# Patient Record
Sex: FEMALE | Employment: FULL TIME | URBAN - METROPOLITAN AREA
[De-identification: names, ages, dates, MRNs, and addresses within clinical notes are randomized per-mention and may not be internally consistent; named-entity substitution may affect disease eponyms.]

---

## 2017-11-13 ENCOUNTER — TELEPHONE (OUTPATIENT)
Dept: INTERNAL MEDICINE | Facility: CLINIC | Age: 42
End: 2017-11-13

## 2017-11-13 ENCOUNTER — OFFICE VISIT (OUTPATIENT)
Dept: INTERNAL MEDICINE | Facility: CLINIC | Age: 42
End: 2017-11-13
Attending: FAMILY MEDICINE
Payer: MEDICAID

## 2017-11-13 VITALS
HEART RATE: 69 BPM | TEMPERATURE: 98 F | DIASTOLIC BLOOD PRESSURE: 66 MMHG | HEIGHT: 66 IN | OXYGEN SATURATION: 98 % | WEIGHT: 116.63 LBS | BODY MASS INDEX: 18.74 KG/M2 | SYSTOLIC BLOOD PRESSURE: 98 MMHG

## 2017-11-13 DIAGNOSIS — Z00.00 ROUTINE GENERAL MEDICAL EXAMINATION AT A HEALTH CARE FACILITY: Primary | ICD-10-CM

## 2017-11-13 DIAGNOSIS — Z29.89 NEED FOR MALARIA PROPHYLAXIS: ICD-10-CM

## 2017-11-13 DIAGNOSIS — R53.83 FATIGUE, UNSPECIFIED TYPE: ICD-10-CM

## 2017-11-13 DIAGNOSIS — N95.1 PERIMENOPAUSE: ICD-10-CM

## 2017-11-13 DIAGNOSIS — Z29.89 NEED FOR MALARIA PROPHYLAXIS: Primary | ICD-10-CM

## 2017-11-13 DIAGNOSIS — Z23 NEED FOR PROPHYLACTIC VACCINATION WITH TYPHOID-PARATYPHOID ALONE (TAB) VACCINE: ICD-10-CM

## 2017-11-13 PROCEDURE — 99999 PR PBB SHADOW E&M-NEW PATIENT-LVL III: CPT | Mod: PBBFAC,,, | Performed by: INTERNAL MEDICINE

## 2017-11-13 PROCEDURE — 99203 OFFICE O/P NEW LOW 30 MIN: CPT | Mod: PBBFAC,PO | Performed by: INTERNAL MEDICINE

## 2017-11-13 PROCEDURE — 99386 PREV VISIT NEW AGE 40-64: CPT | Mod: S$PBB,,, | Performed by: INTERNAL MEDICINE

## 2017-11-13 RX ORDER — DOXYCYCLINE HYCLATE 100 MG
100 TABLET ORAL DAILY
Qty: 45 TABLET | Refills: 0 | Status: SHIPPED | OUTPATIENT
Start: 2017-11-13 | End: 2017-11-14

## 2017-11-13 NOTE — PROGRESS NOTES
Subjective:       Patient ID: Maya Agudelo is a 42 y.o. female.    Chief Complaint: Establish Care    Here to est care.    Wonders about trip upcoming, [place]. Leaving 12/4.    Having noc sweats, poor sleep, hot flashes, irritabilitry, erratic periods. Tried herbal w/black cohosh, which has helped. Symptoms worsening over 3-4 yrs.    Chronic bowel irregularity as well.    Also chronic dysthymia. Has intermittently tried psychotx, meditation, hard to stick with these. frustrated by prev psychotx saying little that can be done w/o trauma hx. Claims hx of add as well.          Review of Systems   Constitutional: Negative for activity change, appetite change and unexpected weight change.   Eyes: Negative for visual disturbance.   Respiratory: Negative for cough, chest tightness and shortness of breath.    Cardiovascular: Negative for chest pain.   Gastrointestinal: Negative for abdominal distention and abdominal pain.   Genitourinary: Positive for menstrual problem. Negative for difficulty urinating and urgency.        No breast lumps/masses/pain/nipple d/c in either breast     Skin: Negative for rash.       Objective:      Physical Exam   Constitutional: She is oriented to person, place, and time. She appears well-developed and well-nourished. No distress.   HENT:   Head: Normocephalic and atraumatic.   Eyes: Pupils are equal, round, and reactive to light. No scleral icterus.   Neck: Normal range of motion. No thyromegaly present.   Cardiovascular: Normal rate, regular rhythm and normal heart sounds.  Exam reveals no gallop and no friction rub.    No murmur heard.  Pulmonary/Chest: Effort normal and breath sounds normal. No respiratory distress. She has no wheezes. She has no rales.   Abdominal: Soft. Bowel sounds are normal. She exhibits no distension and no mass. There is no tenderness. There is no rebound and no guarding.   Musculoskeletal: Normal range of motion. She exhibits no edema or tenderness.    Lymphadenopathy:     She has no cervical adenopathy.   Neurological: She is alert and oriented to person, place, and time.   Skin: She is not diaphoretic.   Psychiatric: She has a normal mood and affect. Her speech is normal and behavior is normal. Cognition and memory are normal.       Assessment:       1. Routine general medical examination at a health care facility    2. Fatigue, unspecified type    3. Perimenopause    4. Need for malaria prophylaxis    5. Need for prophylactic vaccination with typhoid-paratyphoid alone (TAB) vaccine        Plan:       Maya was seen today for establish care.    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 Has appt upcoming with psychiatriost    Perimenopause  Black cohosh helps    Need for malaria prophylaxis  -     doxycycline (VIBRA-TABS) 100 MG tablet; Take 1 tablet (100 mg total) by mouth once daily.    Need for prophylactic vaccination with typhoid-paratyphoid alone (TAB) vaccine  -     (In Office Administered) Typhoid Vaccine (ViCPs) (IM); Future  -     Ambulatory referral to Infectious Disease  Will see if she needs hep A as well      Health Maintenance       Date Due Completion Date    Lipid Panel 1973 ---    TETANUS VACCINE 04/30/1991 ---    Influenza Vaccine 08/01/2017 ---    Pap Smear 10/17/2018 10/17/2017 (Done)    Override on 10/17/2017: Done (normal,)    Mammogram 10/11/2019 10/11/2017 (Done)    Override on 10/11/2017: Done ([facility], normal)          Return in about 1 year (around 11/13/2018).

## 2017-11-13 NOTE — TELEPHONE ENCOUNTER
----- Message from Vanessa Farrell sent at 11/13/2017  4:33 PM CST -----  Contact: True Brantley/Emma [phone]  Prescription Clarification:     The pharmacy needs clarity on the following RX:    Needs doxycycline (VIBRA-TABS) 100 MG tablet changed to doxycycline mono. Insurance will not cover the first one.    Please call and advise.    Thank You

## 2017-11-14 ENCOUNTER — CLINICAL SUPPORT (OUTPATIENT)
Dept: INFECTIOUS DISEASES | Facility: CLINIC | Age: 42
End: 2017-11-14
Payer: MEDICAID

## 2017-11-14 DIAGNOSIS — Z23 NEED FOR PROPHYLACTIC VACCINATION WITH TYPHOID-PARATYPHOID ALONE (TAB) VACCINE: ICD-10-CM

## 2017-11-14 PROCEDURE — 90691 TYPHOID VACCINE IM: CPT | Mod: PBBFAC

## 2017-11-14 RX ORDER — DOXYCYCLINE 100 MG/1
100 CAPSULE ORAL DAILY
Qty: 45 CAPSULE | Refills: 0 | Status: SHIPPED | OUTPATIENT
Start: 2017-11-14 | End: 2017-11-15

## 2017-11-15 ENCOUNTER — OFFICE VISIT (OUTPATIENT)
Dept: INFECTIOUS DISEASES | Facility: CLINIC | Age: 42
End: 2017-11-15
Payer: MEDICAID

## 2017-11-15 VITALS
WEIGHT: 114.19 LBS | SYSTOLIC BLOOD PRESSURE: 98 MMHG | BODY MASS INDEX: 18.35 KG/M2 | HEART RATE: 67 BPM | TEMPERATURE: 98 F | DIASTOLIC BLOOD PRESSURE: 65 MMHG | HEIGHT: 66 IN

## 2017-11-15 DIAGNOSIS — Z23 IMMUNIZATION DUE: ICD-10-CM

## 2017-11-15 DIAGNOSIS — Z71.84 TRAVEL ADVICE ENCOUNTER: Primary | ICD-10-CM

## 2017-11-15 PROCEDURE — 90636 HEP A/HEP B VACC ADULT IM: CPT | Mod: PBBFAC

## 2017-11-15 PROCEDURE — 99402 PREV MED CNSL INDIV APPRX 30: CPT | Mod: S$PBB,,, | Performed by: INTERNAL MEDICINE

## 2017-11-15 PROCEDURE — 99213 OFFICE O/P EST LOW 20 MIN: CPT | Mod: PBBFAC | Performed by: INTERNAL MEDICINE

## 2017-11-15 PROCEDURE — 90471 IMMUNIZATION ADMIN: CPT | Mod: PBBFAC

## 2017-11-15 PROCEDURE — 90472 IMMUNIZATION ADMIN EACH ADD: CPT | Mod: PBBFAC

## 2017-11-15 PROCEDURE — 90715 TDAP VACCINE 7 YRS/> IM: CPT | Mod: PBBFAC

## 2017-11-15 PROCEDURE — 99999 PR PBB SHADOW E&M-EST. PATIENT-LVL III: CPT | Mod: PBBFAC,,, | Performed by: INTERNAL MEDICINE

## 2017-11-15 RX ORDER — AZITHROMYCIN 500 MG/1
TABLET, FILM COATED ORAL
Qty: 2 TABLET | Refills: 0 | Status: SHIPPED | OUTPATIENT
Start: 2017-11-15

## 2017-11-15 RX ORDER — ATOVAQUONE AND PROGUANIL HYDROCHLORIDE 250; 100 MG/1; MG/1
TABLET, FILM COATED ORAL
Qty: 24 TABLET | Refills: 0 | Status: SHIPPED | OUTPATIENT
Start: 2017-11-15 | End: 2018-09-27 | Stop reason: SDUPTHER

## 2017-11-15 NOTE — PROGRESS NOTES
Pt received the Twinrix Hepatitis A/B first tdose, influenza, and Tdap vaccinations. Pt tolerated injections well. Return appts provided. Pt left the unit in [place].

## 2017-11-15 NOTE — PROGRESS NOTES
Subjective:      Patient ID: Maya Agudelo is a 42 y.o. female.    Chief Complaint:Travel Consult ([place] )      History of Present Illness  HPI     Travel Consult    Additional comments: [place]        Last edited by Dalila Jimenez MA on 11/15/2017  9:16 AM. (History)      Travel Consult  Maya Agudelo is here for travel consultation.  Planned departure date: 12/4/17           Planned return date: 12/18/17  Countries of travel: [facility]  Areas in country: rural and urban    Accommodations: hotel  Purpose of travel: vacation  Prior travel out of [place]: unknown  Currently ill / Fever: no  History of liver or kidney disease: no    42F presents to establish care prior to travel to [facility]. Her itinerary calls for air travel from the [place] to [facility] via [place]. In country, she will travel [place] her [place] throughout [place]. She will spend two weeks overseas and sleep indoors on all nights. [place] plans [place]. She is considering getting a tattoo while there. She plans to consume street food and restaurant meals. No plans for significant contact with animals.     She was [place] in the [place] and to her knowledge received all childhood vaccinations. She recently established care with a new PCP, Dr. Bronw. Dr. Brown administered the typhoid vaccine. He kris a Hepatitis panel which was negative. He prescribed doxycycline for malaria prophylaxis.    Review of Systems   Constitution: Negative.   HENT: Negative.    Eyes: Negative.    Cardiovascular: Negative.    Respiratory: Negative.    Endocrine: Negative.    Hematologic/Lymphatic: Negative.    Skin: Negative.    Musculoskeletal: Negative.    Gastrointestinal: Negative.    Genitourinary: Negative.    Neurological: Negative.    Psychiatric/Behavioral: Negative.    Allergic/Immunologic: Negative.      Objective:   Physical Exam   Constitutional: She is oriented to person, place, and time. She appears  well-developed and well-nourished. No distress.   HENT:   Head: Normocephalic and atraumatic.   Eyes: EOM are normal. Pupils are equal, round, and reactive to light.   Neck: Normal range of motion. Neck supple.   Cardiovascular: Normal rate, regular rhythm, normal heart sounds and intact distal pulses.  Exam reveals no gallop and no friction rub.    No murmur heard.  Pulmonary/Chest: Effort normal and breath sounds normal. No respiratory distress. She exhibits no tenderness.   Abdominal: Soft. Bowel sounds are normal. She exhibits no distension. There is no tenderness.   Musculoskeletal: Normal range of motion. She exhibits no edema, tenderness or deformity.   Neurological: She is alert and oriented to person, place, and time. No cranial nerve deficit. Coordination normal.   Skin: Skin is warm and dry. No rash noted. She is not diaphoretic. No erythema. No pallor.   Psychiatric: She has a normal mood and affect. Her behavior is normal. Judgment and thought content normal.   Nursing note and vitals reviewed.    Assessment:       1. Travel advice encounter    2. Immunization due          Plan:       Maya was seen today for travel consult.    - Routine immunization brought up to date with influenzae, tetanus booster  - After discussion with the patient, prescribed Malarone for malaria prophylaxis. Patient will not fill doxycycline prescription.  - Prescribed Hep A given travel to [place]  - Prescribed Hep B given plans for tattoo  - We will administer an expedited combined course of Hep A/B (Twinrix) given plans for travel on 12/4/17.  - Prescribed azithromycin and counseled to take only in the event of blood diarrhea, high fever  - Counseled on hand sanitation and consumption of bottled water. Avoiding raw foods (sushi)    Diagnoses and all orders for this visit:    Travel advice encounter  -     Hepatitis A / Hepatitis B Combined Vaccine (IM); Standing  -     Influenza - Quadrivalent (3 years & older) (PF);  Future  -     Tdap Vaccine; Future  -     atovaquone-proguanil (MALARONE) 250-100 mg Tab; Take one tablet daily for malaria prevention. Begin one day before entering malarious area and continue for 1 week after return.  -     azithromycin (ZITHROMAX) 500 MG tablet; Take 2 tablets once if needed for severe diarrhea.  -     Hepatitis A / Hepatitis B Combined Vaccine (IM)  -     Influenza - Quadrivalent (3 years & older) (PF)  -     Tdap Vaccine    Immunization due  -     Hepatitis A / Hepatitis B Combined Vaccine (IM); Standing  -     Influenza - Quadrivalent (3 years & older) (PF); Future  -     Tdap Vaccine; Future  -     Hepatitis A / Hepatitis B Combined Vaccine (IM)  -     Influenza - Quadrivalent (3 years & older) (PF)  -     Tdap Vaccine

## 2017-11-21 ENCOUNTER — TELEPHONE (OUTPATIENT)
Dept: INTERNAL MEDICINE | Facility: CLINIC | Age: 42
End: 2017-11-21

## 2017-11-21 NOTE — TELEPHONE ENCOUNTER
----- Message from Emily Lam sent at 11/21/2017  9:38 AM CST -----  Contact: self/[phone]  Patient called in regards to needing to talk with Dr Brown medical assistant about prescription, and some medication. Please call and advise.       Thank you!!!

## 2017-11-27 ENCOUNTER — CLINICAL SUPPORT (OUTPATIENT)
Dept: INFECTIOUS DISEASES | Facility: CLINIC | Age: 42
End: 2017-11-27
Payer: MEDICAID

## 2017-11-27 DIAGNOSIS — Z23 IMMUNIZATION DUE: ICD-10-CM

## 2017-11-27 DIAGNOSIS — Z71.84 TRAVEL ADVICE ENCOUNTER: ICD-10-CM

## 2017-11-27 PROCEDURE — 90636 HEP A/HEP B VACC ADULT IM: CPT | Mod: PBBFAC

## 2017-11-27 PROCEDURE — 99999 PR PBB SHADOW E&M-EST. PATIENT-LVL I: CPT | Mod: PBBFAC,,,

## 2017-11-27 PROCEDURE — 99211 OFF/OP EST MAY X REQ PHY/QHP: CPT | Mod: PBBFAC,25

## 2017-11-29 ENCOUNTER — TELEPHONE (OUTPATIENT)
Dept: INTERNAL MEDICINE | Facility: CLINIC | Age: 42
End: 2017-11-29

## 2017-11-29 NOTE — TELEPHONE ENCOUNTER
----- Message from Carole Loob sent at 11/29/2017  2:00 PM CST -----  Contact: patient [phone]  You gave patient a rx for doxycycline for malaria to take on a trip to [facility] with specific directions about how to take them. She is leaving in 2 days and has been trying to speak with you for a week. Does she need to start these before leaving? Please call today with the specific information.

## 2017-12-01 ENCOUNTER — CLINICAL SUPPORT (OUTPATIENT)
Dept: INFECTIOUS DISEASES | Facility: CLINIC | Age: 42
End: 2017-12-01
Payer: MEDICAID

## 2017-12-01 DIAGNOSIS — Z71.84 TRAVEL ADVICE ENCOUNTER: ICD-10-CM

## 2017-12-01 DIAGNOSIS — Z23 IMMUNIZATION DUE: ICD-10-CM

## 2017-12-01 PROCEDURE — 99999 PR PBB SHADOW E&M-EST. PATIENT-LVL I: CPT | Mod: PBBFAC,,,

## 2017-12-01 PROCEDURE — 90636 HEP A/HEP B VACC ADULT IM: CPT | Mod: PBBFAC

## 2017-12-01 PROCEDURE — 99211 OFF/OP EST MAY X REQ PHY/QHP: CPT | Mod: PBBFAC

## 2018-09-27 ENCOUNTER — CLINICAL SUPPORT (OUTPATIENT)
Dept: INFECTIOUS DISEASES | Facility: CLINIC | Age: 43
End: 2018-09-27
Payer: MEDICAID

## 2018-09-27 ENCOUNTER — OFFICE VISIT (OUTPATIENT)
Dept: INFECTIOUS DISEASES | Facility: CLINIC | Age: 43
End: 2018-09-27
Payer: MEDICAID

## 2018-09-27 VITALS
SYSTOLIC BLOOD PRESSURE: 108 MMHG | DIASTOLIC BLOOD PRESSURE: 74 MMHG | BODY MASS INDEX: 17.82 KG/M2 | HEART RATE: 83 BPM | HEIGHT: 66 IN | TEMPERATURE: 98 F | WEIGHT: 110.88 LBS

## 2018-09-27 DIAGNOSIS — Z71.84 TRAVEL ADVICE ENCOUNTER: ICD-10-CM

## 2018-09-27 DIAGNOSIS — Z23 IMMUNIZATION DUE: ICD-10-CM

## 2018-09-27 PROCEDURE — 90636 HEP A/HEP B VACC ADULT IM: CPT | Mod: PBBFAC

## 2018-09-27 PROCEDURE — 99999 PR PBB SHADOW E&M-EST. PATIENT-LVL III: CPT | Mod: PBBFAC,,, | Performed by: INTERNAL MEDICINE

## 2018-09-27 PROCEDURE — 99213 OFFICE O/P EST LOW 20 MIN: CPT | Mod: PBBFAC | Performed by: INTERNAL MEDICINE

## 2018-09-27 PROCEDURE — 99213 OFFICE O/P EST LOW 20 MIN: CPT | Mod: S$PBB,,, | Performed by: INTERNAL MEDICINE

## 2018-09-27 RX ORDER — SERTRALINE HYDROCHLORIDE 50 MG/1
TABLET, FILM COATED ORAL
COMMUNITY
Start: 2018-09-20

## 2018-09-27 RX ORDER — DEXTROAMPHETAMINE SULFATE, DEXTROAMPHETAMINE SACCHARATE, AMPHETAMINE SULFATE AND AMPHETAMINE ASPARTATE 3.75; 3.75; 3.75; 3.75 MG/1; MG/1; MG/1; MG/1
CAPSULE, EXTENDED RELEASE ORAL
Refills: 0 | COMMUNITY
Start: 2018-09-05

## 2018-09-27 RX ORDER — ATOVAQUONE AND PROGUANIL HYDROCHLORIDE 250; 100 MG/1; MG/1
TABLET, FILM COATED ORAL
Qty: 36 TABLET | Refills: 0 | Status: SHIPPED | OUTPATIENT
Start: 2018-09-27

## 2018-09-27 NOTE — PROGRESS NOTES
Subjective:      Patient ID: Maya Agudelo is a 43 y.o. female.    Chief Complaint:Travel Consult      History of Present Illness    Traveling to [place] on October 14th.  She and her  will stay for 28 days.  She presents to continue and complete the schedule for her Twinrix vaccination. She has had all her other vaccinations from her trip to [place] last year. She will need malaria prophylaxis.    Review of Systems   Constitution: Positive for weakness and malaise/fatigue. Negative for chills, decreased appetite, fever, night sweats, weight gain and weight loss.   HENT: Negative for congestion, ear pain, hearing loss, hoarse voice, sore throat and tinnitus.    Eyes: Negative for blurred vision, redness and visual disturbance.   Cardiovascular: Negative for chest pain, leg swelling and palpitations.   Respiratory: Negative for cough, hemoptysis, shortness of breath and sputum production.    Hematologic/Lymphatic: Positive for adenopathy. Does not bruise/bleed easily.   Skin: Negative for dry skin, itching, rash and suspicious lesions.   Musculoskeletal: Positive for back pain, joint pain, myalgias and neck pain.   Gastrointestinal: Negative for abdominal pain, constipation, diarrhea, heartburn, nausea and vomiting.   Genitourinary: Negative for dysuria, flank pain, frequency, hematuria, hesitancy and urgency.   Neurological: Positive for headaches. Negative for dizziness, numbness and paresthesias.   Psychiatric/Behavioral: Negative for depression and memory loss. The patient does not have insomnia and is not nervous/anxious.      Objective:   Physical Exam   Constitutional: She appears well-developed and well-nourished.   Eyes: Conjunctivae and EOM are normal. Pupils are equal, round, and reactive to light.   Nursing note and vitals reviewed.    Assessment:       1. Travel advice encounter          Plan:       Finish Twinrix schedule. Prescribe 35 days of malarone - instructed patient on proper use.  Instructed patient to follow up as needed for post travel illness.

## 2018-09-27 NOTE — PROGRESS NOTES
Ms. watkins received Hep A/B vaccine and tolerated it well. She left he unit in [facility].

## 2020-10-05 ENCOUNTER — PATIENT MESSAGE (OUTPATIENT)
Dept: ADMINISTRATIVE | Facility: HOSPITAL | Age: 45
End: 2020-10-05

## 2021-04-26 ENCOUNTER — PATIENT MESSAGE (OUTPATIENT)
Dept: RESEARCH | Facility: HOSPITAL | Age: 46
End: 2021-04-26